# Patient Record
Sex: MALE | Race: OTHER | Employment: OTHER | URBAN - METROPOLITAN AREA
[De-identification: names, ages, dates, MRNs, and addresses within clinical notes are randomized per-mention and may not be internally consistent; named-entity substitution may affect disease eponyms.]

---

## 2019-01-01 ENCOUNTER — TELEPHONE (OUTPATIENT)
Dept: ORTHOPEDIC SURGERY | Age: 64
End: 2019-01-01

## 2019-12-17 PROBLEM — J44.9 CHRONIC OBSTRUCTIVE PULMONARY DISEASE (HCC): Status: ACTIVE | Noted: 2018-06-10

## 2019-12-17 PROBLEM — M15.9 PRIMARY OSTEOARTHRITIS INVOLVING MULTIPLE JOINTS: Status: ACTIVE | Noted: 2018-06-10

## 2019-12-17 PROBLEM — C61 MALIGNANT NEOPLASM OF PROSTATE (HCC): Status: ACTIVE | Noted: 2018-06-10

## 2019-12-20 NOTE — TELEPHONE ENCOUNTER
Patient was connected with [facility] in error after hours via answering service. He will contact his neurologists office.     Tan Ugalde PA-C  12/19/2019  8:50 PM

## 2020-01-01 ENCOUNTER — TELEPHONE (OUTPATIENT)
Dept: FAMILY MEDICINE CLINIC | Age: 65
End: 2020-01-01

## 2020-01-01 ENCOUNTER — OFFICE VISIT (OUTPATIENT)
Dept: FAMILY MEDICINE CLINIC | Age: 65
End: 2020-01-01

## 2020-01-01 ENCOUNTER — OFFICE VISIT (OUTPATIENT)
Dept: ONCOLOGY | Age: 65
End: 2020-01-01

## 2020-01-01 ENCOUNTER — HOSPITAL ENCOUNTER (OUTPATIENT)
Dept: GENERAL RADIOLOGY | Age: 65
Discharge: HOME | End: 2020-02-28
Payer: MEDICARE

## 2020-01-01 VITALS
BODY MASS INDEX: 20.09 KG/M2 | SYSTOLIC BLOOD PRESSURE: 100 MMHG | OXYGEN SATURATION: 99 % | TEMPERATURE: 98.1 F | RESPIRATION RATE: 16 BRPM | HEART RATE: 55 BPM | WEIGHT: 125 LBS | DIASTOLIC BLOOD PRESSURE: 58 MMHG | HEIGHT: 66 IN

## 2020-01-01 VITALS
SYSTOLIC BLOOD PRESSURE: 86 MMHG | RESPIRATION RATE: 16 BRPM | HEART RATE: 80 BPM | HEIGHT: 70 IN | OXYGEN SATURATION: 98 % | DIASTOLIC BLOOD PRESSURE: 56 MMHG | BODY MASS INDEX: 17.72 KG/M2 | WEIGHT: 123.8 LBS | TEMPERATURE: 98.8 F

## 2020-01-01 VITALS
RESPIRATION RATE: 16 BRPM | TEMPERATURE: 98.1 F | BODY MASS INDEX: 21.82 KG/M2 | OXYGEN SATURATION: 98 % | DIASTOLIC BLOOD PRESSURE: 68 MMHG | HEART RATE: 83 BPM | WEIGHT: 144 LBS | SYSTOLIC BLOOD PRESSURE: 124 MMHG | HEIGHT: 68 IN

## 2020-01-01 VITALS
SYSTOLIC BLOOD PRESSURE: 108 MMHG | TEMPERATURE: 98.9 F | RESPIRATION RATE: 16 BRPM | WEIGHT: 144 LBS | DIASTOLIC BLOOD PRESSURE: 68 MMHG | OXYGEN SATURATION: 99 % | BODY MASS INDEX: 21.82 KG/M2 | HEIGHT: 68 IN | HEART RATE: 68 BPM

## 2020-01-01 VITALS
TEMPERATURE: 98.6 F | HEART RATE: 68 BPM | SYSTOLIC BLOOD PRESSURE: 102 MMHG | RESPIRATION RATE: 16 BRPM | BODY MASS INDEX: 22.19 KG/M2 | HEIGHT: 70 IN | DIASTOLIC BLOOD PRESSURE: 64 MMHG | WEIGHT: 155 LBS | OXYGEN SATURATION: 98 %

## 2020-01-01 DIAGNOSIS — J20.9 ACUTE BRONCHITIS, UNSPECIFIED ORGANISM: Primary | ICD-10-CM

## 2020-01-01 DIAGNOSIS — C61 MALIGNANT NEOPLASM OF PROSTATE (HCC): Primary | ICD-10-CM

## 2020-01-01 DIAGNOSIS — J20.9 ACUTE BRONCHITIS, UNSPECIFIED ORGANISM: ICD-10-CM

## 2020-01-01 DIAGNOSIS — Z72.0 TOBACCO USE: ICD-10-CM

## 2020-01-01 DIAGNOSIS — C61 MALIGNANT NEOPLASM OF PROSTATE (HCC): ICD-10-CM

## 2020-01-01 DIAGNOSIS — M54.16 LUMBAR RADICULOPATHY: ICD-10-CM

## 2020-01-01 DIAGNOSIS — Z11.59 ENCOUNTER FOR HEPATITIS C SCREENING TEST FOR LOW RISK PATIENT: ICD-10-CM

## 2020-01-01 DIAGNOSIS — Z13.220 SCREENING, LIPID: ICD-10-CM

## 2020-01-01 DIAGNOSIS — F17.200 SMOKER: ICD-10-CM

## 2020-01-01 DIAGNOSIS — Z12.11 COLON CANCER SCREENING: ICD-10-CM

## 2020-01-01 DIAGNOSIS — C61 METASTASIS FROM MALIGNANT NEOPLASM OF PROSTATE (HCC): ICD-10-CM

## 2020-01-01 DIAGNOSIS — Z00.00 MEDICARE ANNUAL WELLNESS VISIT, SUBSEQUENT: Primary | ICD-10-CM

## 2020-01-01 DIAGNOSIS — C79.9 METASTASIS FROM MALIGNANT NEOPLASM OF PROSTATE (HCC): ICD-10-CM

## 2020-01-01 DIAGNOSIS — Z13.1 SCREENING FOR DIABETES MELLITUS (DM): ICD-10-CM

## 2020-01-01 DIAGNOSIS — R05.9 COUGH: ICD-10-CM

## 2020-01-01 DIAGNOSIS — Z71.89 ADVANCED DIRECTIVES, COUNSELING/DISCUSSION: ICD-10-CM

## 2020-01-01 LAB
FLUAV+FLUBV AG NOSE QL IA.RAPID: NEGATIVE POS/NEG
FLUAV+FLUBV AG NOSE QL IA.RAPID: NEGATIVE POS/NEG
VALID INTERNAL CONTROL?: YES

## 2020-01-01 PROCEDURE — 71046 X-RAY EXAM CHEST 2 VIEWS: CPT

## 2020-01-01 RX ORDER — MORPHINE SULFATE 20 MG/ML
SOLUTION ORAL
COMMUNITY
Start: 2020-01-01

## 2020-01-01 RX ORDER — GABAPENTIN 600 MG/1
TABLET ORAL
COMMUNITY
Start: 2019-01-01 | End: 2020-01-01

## 2020-01-01 RX ORDER — ALBUTEROL SULFATE 90 UG/1
2 AEROSOL, METERED RESPIRATORY (INHALATION)
Qty: 1 INHALER | Refills: 0 | Status: SHIPPED | OUTPATIENT
Start: 2020-01-01 | End: 2020-01-01 | Stop reason: SDUPTHER

## 2020-01-01 RX ORDER — MORPHINE SULFATE 30 MG/1
TABLET, FILM COATED, EXTENDED RELEASE ORAL
COMMUNITY
Start: 2020-01-01

## 2020-01-01 RX ORDER — AZITHROMYCIN 250 MG/1
TABLET, FILM COATED ORAL
Qty: 6 TAB | Refills: 0 | Status: SHIPPED | OUTPATIENT
Start: 2020-01-01 | End: 2020-01-01 | Stop reason: SDUPTHER

## 2020-01-01 RX ORDER — CEPHALEXIN 500 MG/1
CAPSULE ORAL
COMMUNITY
Start: 2019-01-01 | End: 2020-01-01

## 2020-01-01 RX ORDER — HYDROMORPHONE HYDROCHLORIDE 2 MG/1
TABLET ORAL
COMMUNITY
Start: 2020-01-01 | End: 2020-01-01

## 2020-01-01 RX ORDER — AZITHROMYCIN 250 MG/1
TABLET, FILM COATED ORAL
COMMUNITY
Start: 2019-01-01 | End: 2020-01-01 | Stop reason: SDUPTHER

## 2020-01-01 RX ORDER — AZITHROMYCIN 250 MG/1
TABLET, FILM COATED ORAL
Qty: 6 TAB | Refills: 0 | Status: SHIPPED | OUTPATIENT
Start: 2020-01-01 | End: 2020-01-01

## 2020-01-01 RX ORDER — ALBUTEROL SULFATE 90 UG/1
AEROSOL, METERED RESPIRATORY (INHALATION)
Qty: 6.7 INHALER | Refills: 0 | Status: SHIPPED | OUTPATIENT
Start: 2020-01-01 | End: 2020-01-01

## 2020-01-01 RX ORDER — ALBUTEROL SULFATE 90 UG/1
2 AEROSOL, METERED RESPIRATORY (INHALATION)
Qty: 1 INHALER | Refills: 0 | Status: SHIPPED | OUTPATIENT
Start: 2020-01-01 | End: 2020-01-01

## 2020-01-01 RX ORDER — HYDROMORPHONE HYDROCHLORIDE 4 MG/1
TABLET ORAL
COMMUNITY
Start: 2020-01-01

## 2020-01-01 RX ORDER — DEXAMETHASONE 4 MG/1
TABLET ORAL
COMMUNITY
Start: 2020-01-01

## 2020-01-09 NOTE — PROGRESS NOTES
Marce Villalobos, 59 y.o.,  male    SUBJECTIVE  Establish care    Prostate cancer metastatic to bone- dx 2016, pt with urinary retention on pérez. He is following urology dr Karen Veliz and oncology Ludie Claude  He has chronic back pain on gabapentin, he is following with neurologist, unclear if due to bone mets    Smoker- on records he has COPD, he denies recurrent bronchitis or inhaler use. He continues to smoke    ROS:  See HPI, all others negative        Patient Active Problem List   Diagnosis Code    Chronic obstructive pulmonary disease ([facility] [address] J44.9    Malignant neoplasm of prostate ([facility] [address] C61    Primary osteoarthritis involving multiple joints M15.0       Current Outpatient Medications   Medication Sig Dispense Refill    gabapentin (NEURONTIN) 600 mg tablet       enzalutamide (XTANDI) 40 mg capsule Take 160 mg by mouth daily.  cyclobenzaprine (FLEXERIL) 5 mg tablet Take 5 mg by mouth.  oxyCODONE-acetaminophen (PERCOCET) 5-325 mg per tablet Take  by mouth every four (4) hours as needed for Pain.  predniSONE (DELTASONE) 5 mg tablet Take  by mouth.  azithromycin (ZITHROMAX) 250 mg tablet       cephALEXin (KEFLEX) 500 mg capsule       diclofenac (VOLTAREN) 1 % gel Apply  to affected area four (4) times daily.  diazePAM (VALIUM) 5 mg tablet Take 5 mg by mouth every six (6) hours as needed for Anxiety.  abiraterone (ZYTIGA) 250 mg tab Take  by mouth.          Allergies   Allergen Reactions    Naproxen Hives       Past Medical History:   Diagnosis Date    COPD (chronic obstructive pulmonary disease) ([facility] [address]     Prostate cancer (HCC)     Prostate cancer metastatic to multiple sites [facility])        Social History     Socioeconomic History    Marital status: SINGLE     Spouse name: Not on file    Number of children: Not on file    Years of education: Not on file    Highest education level: Not on file   Occupational History    Not on file   Social Needs    Financial resource strain: Not on file    Food insecurity:     Worry: Not on file     Inability: Not on file    Transportation needs:     Medical: Not on file     Non-medical: Not on file   Tobacco Use    Smoking status: Current Every Day Smoker    Smokeless tobacco: Never Used   Substance and Sexual Activity    Alcohol use: Not Currently    Drug use: Not on file    Sexual activity: Not on file   Lifestyle    Physical activity:     Days per week: Not on file     Minutes per session: Not on file    Stress: Not on file   Relationships    Social connections:     Talks on phone: Not on file     Gets together: Not on file     Attends Druze service: Not on file     Active member of club or organization: Not on file     Attends meetings of clubs or organizations: Not on file     Relationship status: Not on file    Intimate partner violence:     Fear of current or ex partner: Not on file     Emotionally abused: Not on file     Physically abused: Not on file     Forced sexual activity: Not on file   Other Topics Concern    Not on file   Social History Narrative    Not on file       Family History   Problem Relation Age of Onset    Cancer Mother     Cancer Father          OBJECTIVE    Physical Exam:     Visit Vitals  /64 (BP 1 Location: Left arm, BP Patient Position: Sitting)   Pulse 68   Temp 98.6 °F (37 °C) (Oral)   Resp 16   Ht 5' 10\" (1.778 m)   Wt 155 lb (70.3 kg)   SpO2 98%   BMI 22.24 kg/m²       General: alert, chronically ill-appearing, in no apparent distress or pain  Head: atraumatic.  Non-tender maxillary and frontal sinuses  Eyes: Lids with no discharge, no matting, conjunctivae clear and non injected, full EOMs, PERLLA  Ears: pinna non-tender, external auditory canal patent, TM intact  Mouth/throat:tonsils non enlarged, pharynx non erythematous and no lesion, nasal mucosa normal, poor dentition  Neck: supple, no adenopathy palpated  CVS: normal rate, regular rhythm, distinct S1 and S2  Lungs:clear to ausculation bilaterally, no crackles, wheezing or rhonchi noted  Abdomen: normoactive bowel sounds, soft, non-tender  Extremities: no edema, no cyanosis, MSK grossly normal  Skin: warm, no lesions, rashes noted  Psych:  mood and affect normal        ASSESSMENT/PLAN  Diagnoses and all orders for this visit:    1. Malignant neoplasm of prostate ([facility] [address]  With bone metastasis and urinary retention, on pérez  Following urology/oncology    2. Lumbar radiculopathy  Fair control  Following neurology    3. Tobacco use  Discussed CV screening and PCV/flu vaccines, he declines  Encouraged cessation      Follow-up and Dispositions    · Return if symptoms worsen or fail to improve. Patient understands plan of care. Patient has provided input and agrees with goals.

## 2020-01-09 NOTE — PATIENT INSTRUCTIONS
Stopping Smoking: Care Instructions  Your Care Instructions  Cigarette smokers crave the nicotine in cigarettes. Giving it up is much harder than simply changing a habit. Your body has to stop craving the nicotine. It is hard to quit, but you can do it. There are many tools that people use to quit smoking. You may find that combining tools works best for you. There are several steps to quitting. First you get ready to quit. Then you get support to help you. After that, you learn new skills and behaviors to become a nonsmoker. For many people, a necessary step is getting and using medicine. Your doctor will help you set up the plan that best meets your needs. You may want to attend a smoking cessation program to help you quit smoking. When you choose a program, look for one that has proven success. Ask your doctor for ideas. You will greatly increase your chances of success if you take medicine as well as get counseling or join a cessation program.  Some of the changes you feel when you first quit tobacco are uncomfortable. Your body will miss the nicotine at first, and you may feel short-tempered and grumpy. You may have trouble sleeping or concentrating. Medicine can help you deal with these symptoms. You may struggle with changing your smoking habits and rituals. The last step is the tricky one: Be prepared for the smoking urge to continue for a time. This is a lot to deal with, but keep at it. You will feel better. Follow-up care is a key part of your treatment and safety. Be sure to make and go to all appointments, and call your doctor if you are having problems. It's also a good idea to know your test results and keep a list of the medicines you take. How can you care for yourself at home? · Ask your family, friends, and coworkers for support. You have a better chance of quitting if you have help and support.   · Join a support group, such as Nicotine Anonymous, for people who are trying to quit smoking. · Consider signing up for a smoking cessation program, such as the American Lung Association's Freedom from Smoking program.  · Get text messaging support. Go to the website at www.smokefree. gov to sign up for the [facility] program.  · Set a quit date. Pick your date carefully so that it is not right in the middle of a big deadline or stressful time. Once you quit, do not even take a puff. Get rid of all ashtrays and lighters after your last cigarette. Clean your house and your clothes so that they do not smell of smoke. · Learn how to be a nonsmoker. Think about ways you can avoid those things that make you reach for a cigarette. ? Avoid situations that put you at greatest risk for smoking. For some people, it is hard to have a drink with friends without smoking. For others, they might skip a coffee break with coworkers who smoke. ? Change your daily routine. Take a different route to work or eat a meal in a different place. · Cut down on stress. Calm yourself or release tension by doing an activity you enjoy, such as reading a book, taking a hot bath, or gardening. · Talk to your doctor or pharmacist about nicotine replacement therapy, which replaces the nicotine in your body. You still get nicotine but you do not use tobacco. Nicotine replacement products help you slowly reduce the amount of nicotine you need. These products come in several forms, many of them available over-the-counter:  ? Nicotine patches  ? Nicotine gum and lozenges  ? Nicotine inhaler  · Ask your doctor about bupropion (Wellbutrin) or varenicline (Chantix), which are prescription medicines. They do not contain nicotine. They help you by reducing withdrawal symptoms, such as stress and anxiety. · Some people find hypnosis, acupuncture, and massage helpful for ending the smoking habit. · Eat a healthy diet and get regular exercise. Having healthy habits will help your body move past its craving for nicotine.   · Be prepared to keep trying. Most people are not successful the first few times they try to quit. Do not get mad at yourself if you smoke again. Make a list of things you learned and think about when you want to try again, such as next week, next month, or next year. Where can you learn more? Go to http://vangie-sanjay.info/. Enter N884 in the search box to learn more about \"Stopping Smoking: Care Instructions. \"  Current as of: September 26, 2018  Content Version: 12.2  © 0745-1825 [employer]. Care instructions adapted under license by [employer] (which disclaims liability or warranty for this information). If you have questions about a medical condition or this instruction, always ask your healthcare professional. [address] any warranty or liability for your use of this information.

## 2020-02-28 NOTE — PROGRESS NOTES
Tho Masters, 59 y.o.,  male    SUBJECTIVE  Cough x 1 week    C/o productive cough, sob, wheezing, chills past week. He is a smoker. Denies previous h/o COPD, and last albuterol use was about 3 years ago. He has prostate ca, ongoing radiation. Did not get flu vaccine this season. ROS:  See HPI, all others negative        Patient Active Problem List   Diagnosis Code    Chronic obstructive pulmonary disease ([facility] [address] J44.9    Malignant neoplasm of prostate ([facility] [address] C61    Primary osteoarthritis involving multiple joints M15.0       Current Outpatient Medications   Medication Sig Dispense Refill    azithromycin (ZITHROMAX) 250 mg tablet Use as directed 6 Tab 0    albuterol (PROVENTIL HFA, VENTOLIN HFA, PROAIR HFA) 90 mcg/actuation inhaler Take 2 Puffs by inhalation every four (4) hours as needed for Wheezing. 1 Inhaler 0    gabapentin (NEURONTIN) 600 mg tablet       cyclobenzaprine (FLEXERIL) 5 mg tablet Take 5 mg by mouth.  oxyCODONE-acetaminophen (PERCOCET) 5-325 mg per tablet Take  by mouth every four (4) hours as needed for Pain.  cephALEXin (KEFLEX) 500 mg capsule       enzalutamide (XTANDI) 40 mg capsule Take 160 mg by mouth daily.  diclofenac (VOLTAREN) 1 % gel Apply  to affected area four (4) times daily.  predniSONE (DELTASONE) 5 mg tablet Take  by mouth.  diazePAM (VALIUM) 5 mg tablet Take 5 mg by mouth every six (6) hours as needed for Anxiety.  abiraterone (ZYTIGA) 250 mg tab Take  by mouth.          Allergies   Allergen Reactions    Naproxen Hives       Past Medical History:   Diagnosis Date    COPD (chronic obstructive pulmonary disease) (HCC)     Prostate cancer (HCC)     Prostate cancer metastatic to multiple sites [facility])        Social History     Socioeconomic History    Marital status:      Spouse name: Not on file    Number of children: Not on file    Years of education: Not on file    Highest education level: Not on file   Occupational History  Not on file   Social Needs    Financial resource strain: Not on file    Food insecurity:     Worry: Not on file     Inability: Not on file    Transportation needs:     Medical: Not on file     Non-medical: Not on file   Tobacco Use    Smoking status: Current Every Day Smoker    Smokeless tobacco: Never Used   Substance and Sexual Activity    Alcohol use: Not Currently    Drug use: Not on file    Sexual activity: Not on file   Lifestyle    Physical activity:     Days per week: Not on file     Minutes per session: Not on file    Stress: Not on file   Relationships    Social connections:     Talks on phone: Not on file     Gets together: Not on file     Attends Mandaen service: Not on file     Active member of club or organization: Not on file     Attends meetings of clubs or organizations: Not on file     Relationship status: Not on file    Intimate partner violence:     Fear of current or ex partner: Not on file     Emotionally abused: Not on file     Physically abused: Not on file     Forced sexual activity: Not on file   Other Topics Concern    Not on file   Social History Narrative    Not on file       Family History   Problem Relation Age of Onset    Cancer Mother     Cancer Father          OBJECTIVE    Physical Exam:     Visit Vitals  /68 (BP 1 Location: Left arm, BP Patient Position: Sitting)   Pulse 83   Temp 98.1 °F (36.7 °C) (Oral)   Resp 16   Ht 5' 8\" (1.727 m)   Wt 144 lb (65.3 kg)   SpO2 98%   BMI 21.90 kg/m²       General: alert, mildly ill-appearing,   Head: atraumatic.  Non-tender maxillary and frontal sinuses  Eyes: Lids with no discharge, no matting, conjunctivae clear and non injected, full EOMs, PERLLA  Ears: pinna non-tender, external auditory canal patent, TM intact  Mouth/throat:tonsils non enlarged, pharynx non erythematous and no lesion, nasal mucosa normal  Neck: supple, no adenopathy palpated  CVS: normal rate, regular rhythm, distinct S1 and S2  Lungs:+ diffuse wheeze  Abdomen: normoactive bowel sounds, soft, non-tender  Extremities: no edema, no cyanosis, MSK grossly normal  Skin: warm, no lesions, rashes noted  Psych:  mood and affect normal    Results for orders placed or performed in visit on 02/28/20   AMB POC RAPID INFLUENZA TEST   Result Value Ref Range    VALID INTERNAL CONTROL POC Yes     Influenza A Ag POC Negative Negative Pos/Neg    Influenza B Ag POC Negative Negative Pos/Neg         ASSESSMENT/PLAN  Diagnoses and all orders for this visit:    1. Acute bronchitis, unspecified organism  -     XR CHEST PA LAT; Future\  Start:  -     azithromycin (ZITHROMAX) 250 mg tablet; Use as directed  -     albuterol (PROVENTIL HFA, VENTOLIN HFA, PROAIR HFA) 90 mcg/actuation inhaler; Take 2 Puffs by inhalation every four (4) hours as needed for Wheezing. 2. Cough  -     AMB POC RAPID INFLUENZA TEST    3. Smoker  -     XR CHEST PA LAT; Future  Encouraged smoking cessation    Other orders    Follow-up and Dispositions    · Return if symptoms worsen or fail to improve. Patient understands plan of care. Patient has provided input and agrees with goals.

## 2020-02-28 NOTE — PATIENT INSTRUCTIONS
Stopping Smoking: Care Instructions  Your Care Instructions  Cigarette smokers crave the nicotine in cigarettes. Giving it up is much harder than simply changing a habit. Your body has to stop craving the nicotine. It is hard to quit, but you can do it. There are many tools that people use to quit smoking. You may find that combining tools works best for you. There are several steps to quitting. First you get ready to quit. Then you get support to help you. After that, you learn new skills and behaviors to become a nonsmoker. For many people, a necessary step is getting and using medicine. Your doctor will help you set up the plan that best meets your needs. You may want to attend a smoking cessation program to help you quit smoking. When you choose a program, look for one that has proven success. Ask your doctor for ideas. You will greatly increase your chances of success if you take medicine as well as get counseling or join a cessation program.  Some of the changes you feel when you first quit tobacco are uncomfortable. Your body will miss the nicotine at first, and you may feel short-tempered and grumpy. You may have trouble sleeping or concentrating. Medicine can help you deal with these symptoms. You may struggle with changing your smoking habits and rituals. The last step is the tricky one: Be prepared for the smoking urge to continue for a time. This is a lot to deal with, but keep at it. You will feel better. Follow-up care is a key part of your treatment and safety. Be sure to make and go to all appointments, and call your doctor if you are having problems. It's also a good idea to know your test results and keep a list of the medicines you take. How can you care for yourself at home? · Ask your family, friends, and coworkers for support. You have a better chance of quitting if you have help and support.   · Join a support group, such as Nicotine Anonymous, for people who are trying to quit smoking. · Consider signing up for a smoking cessation program, such as the American Lung Association's Freedom from Smoking program.  · Get text messaging support. Go to the website at www.smokefree. gov to sign up for the [facility] program.  · Set a quit date. Pick your date carefully so that it is not right in the middle of a big deadline or stressful time. Once you quit, do not even take a puff. Get rid of all ashtrays and lighters after your last cigarette. Clean your house and your clothes so that they do not smell of smoke. · Learn how to be a nonsmoker. Think about ways you can avoid those things that make you reach for a cigarette. ? Avoid situations that put you at greatest risk for smoking. For some people, it is hard to have a drink with friends without smoking. For others, they might skip a coffee break with coworkers who smoke. ? Change your daily routine. Take a different route to work or eat a meal in a different place. · Cut down on stress. Calm yourself or release tension by doing an activity you enjoy, such as reading a book, taking a hot bath, or gardening. · Talk to your doctor or pharmacist about nicotine replacement therapy, which replaces the nicotine in your body. You still get nicotine but you do not use tobacco. Nicotine replacement products help you slowly reduce the amount of nicotine you need. These products come in several forms, many of them available over-the-counter:  ? Nicotine patches  ? Nicotine gum and lozenges  ? Nicotine inhaler  · Ask your doctor about bupropion (Wellbutrin) or varenicline (Chantix), which are prescription medicines. They do not contain nicotine. They help you by reducing withdrawal symptoms, such as stress and anxiety. · Some people find hypnosis, acupuncture, and massage helpful for ending the smoking habit. · Eat a healthy diet and get regular exercise. Having healthy habits will help your body move past its craving for nicotine.   · Be prepared to keep trying. Most people are not successful the first few times they try to quit. Do not get mad at yourself if you smoke again. Make a list of things you learned and think about when you want to try again, such as next week, next month, or next year. Where can you learn more? Go to http://vangie-sanjay.info/. Enter U270 in the search box to learn more about \"Stopping Smoking: Care Instructions. \"  Current as of: September 26, 2018  Content Version: 12.2  © 0815-1607 [employer]. Care instructions adapted under license by [employer] (which disclaims liability or warranty for this information). If you have questions about a medical condition or this instruction, always ask your healthcare professional. [address] any warranty or liability for your use of this information. Bronchitis: Care Instructions  Your Care Instructions    Bronchitis is inflammation of the bronchial tubes, which carry air to the lungs. The tubes swell and produce mucus, or phlegm. The mucus and inflamed bronchial tubes make you cough. You may have trouble breathing. Most cases of bronchitis are caused by viruses like those that cause colds. Antibiotics usually do not help and they may be harmful. Bronchitis usually develops rapidly and lasts about 2 to 3 weeks in otherwise healthy people. Follow-up care is a key part of your treatment and safety. Be sure to make and go to all appointments, and call your doctor if you are having problems. It's also a good idea to know your test results and keep a list of the medicines you take. How can you care for yourself at home? · Take all medicines exactly as prescribed. Call your doctor if you think you are having a problem with your medicine.   · Get some extra rest.  · Take an over-the-counter pain medicine, such as acetaminophen (Tylenol), ibuprofen (Advil, Motrin), or naproxen (Aleve) to reduce fever and relieve body aches. Read and follow all instructions on the label. · Do not take two or more pain medicines at the same time unless the doctor told you to. Many pain medicines have acetaminophen, which is Tylenol. Too much acetaminophen (Tylenol) can be harmful. · Take an over-the-counter cough medicine that contains dextromethorphan to help quiet a dry, hacking cough so that you can sleep. Avoid cough medicines that have more than one active ingredient. Read and follow all instructions on the label. · Breathe moist air from a humidifier, hot shower, or sink filled with hot water. The heat and moisture will thin mucus so you can cough it out. · Do not smoke. Smoking can make bronchitis worse. If you need help quitting, talk to your doctor about stop-smoking programs and medicines. These can increase your chances of quitting for good. When should you call for help? Call 911 anytime you think you may need emergency care. For example, call if:    · You have severe trouble breathing.    Call your doctor now or seek immediate medical care if:    · You have new or worse trouble breathing.     · You cough up dark brown or bloody mucus (sputum).     · You have a new or higher fever.     · You have a new rash.    Watch closely for changes in your health, and be sure to contact your doctor if:    · You cough more deeply or more often, especially if you notice more mucus or a change in the color of your mucus.     · You are not getting better as expected. Where can you learn more? Go to http://vangie-sanjay.info/. Enter H333 in the search box to learn more about \"Bronchitis: Care Instructions. \"  Current as of: June 9, 2019  Content Version: 12.2  © 4056-6635 [employer]. Care instructions adapted under license by [employer] (which disclaims liability or warranty for this information).  If you have questions about a medical condition or this instruction, always ask your healthcare professional. [address] any warranty or liability for your use of this information.

## 2020-02-28 NOTE — PROGRESS NOTES
1. Have you been to the ER, urgent care clinic since your last visit? Hospitalized since your last visit? No    2. Have you seen or consulted any other health care providers outside of the [address] [place] [address] since your last visit? Include any pap smears or colon screening.  No

## 2020-03-20 PROBLEM — M54.16 LUMBAR RADICULOPATHY: Status: ACTIVE | Noted: 2020-01-01

## 2020-03-20 PROBLEM — Z72.0 TOBACCO USE: Status: ACTIVE | Noted: 2020-01-01

## 2020-03-20 NOTE — PROGRESS NOTES
This is the Subsequent Medicare Annual Wellness Exam, performed 12 months or more after the Initial AWV or the last Subsequent AWV    I have reviewed the patient's medical history in detail and updated the computerized patient record. History     Patient Active Problem List   Diagnosis Code    Chronic obstructive pulmonary disease ([facility] [address] J44.9    Malignant neoplasm of prostate ([facility] [address] C61    Primary osteoarthritis involving multiple joints M15.0     Past Medical History:   Diagnosis Date    COPD (chronic obstructive pulmonary disease) ([facility] [address]     Prostate cancer ([facility] [address]     Prostate cancer metastatic to multiple sites [facility])       Past Surgical History:   Procedure Laterality Date    HX HERNIA REPAIR      HX UROLOGICAL      Prostate Biopsy     Current Outpatient Medications   Medication Sig Dispense Refill    albuterol (PROVENTIL HFA, VENTOLIN HFA, PROAIR HFA) 90 mcg/actuation inhaler Take 2 Puffs by inhalation every four (4) hours as needed for Wheezing. 1 Inhaler 0    cyclobenzaprine (FLEXERIL) 5 mg tablet Take 5 mg by mouth.  oxyCODONE-acetaminophen (PERCOCET) 5-325 mg per tablet Take  by mouth every four (4) hours as needed for Pain.  azithromycin (ZITHROMAX) 250 mg tablet Use as directed 6 Tab 0    cephALEXin (KEFLEX) 500 mg capsule       gabapentin (NEURONTIN) 600 mg tablet       enzalutamide (XTANDI) 40 mg capsule Take 160 mg by mouth daily.  diclofenac (VOLTAREN) 1 % gel Apply  to affected area four (4) times daily.  predniSONE (DELTASONE) 5 mg tablet Take  by mouth.  diazePAM (VALIUM) 5 mg tablet Take 5 mg by mouth every six (6) hours as needed for Anxiety.  abiraterone (ZYTIGA) 250 mg tab Take  by mouth.        Allergies   Allergen Reactions    Naproxen Hives       Family History   Problem Relation Age of Onset    Cancer Mother     Cancer Father      Social History     Tobacco Use    Smoking status: Current Every Day Smoker    Smokeless tobacco: Never Used Substance Use Topics    Alcohol use: Not Currently       Depression Risk Factor Screening:     3 most recent PHQ Screens 3/20/2020   Little interest or pleasure in doing things Not at all   Feeling down, depressed, irritable, or hopeless Not at all   Total Score PHQ 2 0       Alcohol Risk Factor Screening (MALE < 65): Do you average more than 2 drinks per night or 14 drinks a week: No    On any one occasion in the past three months have you have had more than 4 drinks containing alcohol:  No      Functional Ability and Level of Safety:   Hearing: Hearing is good. Activities of Daily Living: The home contains: no safety equipment. Patient does total self care    Ambulation: with no difficulty    Fall Risk:  No flowsheet data found.     Abuse Screen:  Patient is not abused    Cognitive Screening   Has your family/caregiver stated any concerns about your memory: no      Patient Care Team   Patient Care Team:  Santos Tamayo MD as PCP - General (Family Practice)  Santos Tamayo MD as PCP - [facility] Empaneled Provider  Willard Gonzalez MD (Hematology and Oncology)    Assessment/Plan   Education and counseling provided:  Are appropriate based on today's review and evaluation  End-of-Life planning (with patient's consent)- discussed, provided form  Pneumococcal Vaccine- pt declines  Influenza Vaccine- pt declines  Colorectal cancer screening tests- pt wants to cont to check, FIT test provided  Cardiovascular screening blood test- pt wants to check, order placed  Diabetes screening test- pt wants to check, order placed    Medicare annual wellness visit, subsequent      Health Maintenance Due   Topic Date Due    Hepatitis C Screening  1955    Pneumococcal 0-64 years (1 of 3 - PCV13) 06/19/1961    DTaP/Tdap/Td series (1 - Tdap) 06/19/1976    Shingrix Vaccine Age 50> (1 of 2) 06/19/2005    FOBT Q1Y Age 50-75  06/19/2005    Medicare Yearly Exam  02/28/2020     Cyrus Bumpers, 59 y.o., male    SUBJECTIVE  Ff-up    Acute bronchitis- cough has improved, continues to be afebrile. He completed zpack, cxr reviewed no acute findings. He denies recurrent bronchitis episodes and denies previous h/o copd. He denies sob. He continues to smoker. Prostate cancer metastatic to bone- dx 2016, pt with urinary retention on pérez. He is following urology, reviewed note on ADT with lupron [id number], on palliative radiation, also following oncology   He has chronic back pain on gabapentin, he is following with neurologist, unclear if due to bone mets    ROS:  See HPI, all others negative        Patient Active Problem List   Diagnosis Code    Chronic obstructive pulmonary disease ([facility] [address] J44.9    Malignant neoplasm of prostate ([facility] [address] C61    Primary osteoarthritis involving multiple joints M15.0       Current Outpatient Medications   Medication Sig Dispense Refill    albuterol (PROVENTIL HFA, VENTOLIN HFA, PROAIR HFA) 90 mcg/actuation inhaler Take 2 Puffs by inhalation every four (4) hours as needed for Wheezing. 1 Inhaler 0    cyclobenzaprine (FLEXERIL) 5 mg tablet Take 5 mg by mouth.  oxyCODONE-acetaminophen (PERCOCET) 5-325 mg per tablet Take  by mouth every four (4) hours as needed for Pain.  azithromycin (ZITHROMAX) 250 mg tablet Use as directed 6 Tab 0    cephALEXin (KEFLEX) 500 mg capsule       gabapentin (NEURONTIN) 600 mg tablet       enzalutamide (XTANDI) 40 mg capsule Take 160 mg by mouth daily.  diclofenac (VOLTAREN) 1 % gel Apply  to affected area four (4) times daily.  predniSONE (DELTASONE) 5 mg tablet Take  by mouth.  diazePAM (VALIUM) 5 mg tablet Take 5 mg by mouth every six (6) hours as needed for Anxiety.  abiraterone (ZYTIGA) 250 mg tab Take  by mouth.          Allergies   Allergen Reactions    Naproxen Hives       Past Medical History:   Diagnosis Date    COPD (chronic obstructive pulmonary disease) (HCC)     Prostate cancer (HCC)     Prostate cancer metastatic to multiple sites [facility])        Social History     Socioeconomic History    Marital status: SINGLE     Spouse name: Not on file    Number of children: Not on file    Years of education: Not on file    Highest education level: Not on file   Occupational History    Not on file   Social Needs    Financial resource strain: Not on file    Food insecurity     Worry: Not on file     Inability: Not on file    Transportation needs     Medical: Not on file     Non-medical: Not on file   Tobacco Use    Smoking status: Current Every Day Smoker    Smokeless tobacco: Never Used   Substance and Sexual Activity    Alcohol use: Not Currently    Drug use: Not on file    Sexual activity: Not on file   Lifestyle    Physical activity     Days per week: Not on file     Minutes per session: Not on file    Stress: Not on file   Relationships    Social connections     Talks on phone: Not on file     Gets together: Not on file     Attends Anglican service: Not on file     Active member of club or organization: Not on file     Attends meetings of clubs or organizations: Not on file     Relationship status: Not on file    Intimate partner violence     Fear of current or ex partner: Not on file     Emotionally abused: Not on file     Physically abused: Not on file     Forced sexual activity: Not on file   Other Topics Concern    Not on file   Social History Narrative    Not on file       Family History   Problem Relation Age of Onset    Cancer Mother     Cancer Father          OBJECTIVE    Physical Exam:     Visit Vitals  /68 (BP 1 Location: Left arm, BP Patient Position: Sitting)   Pulse 68   Temp 98.9 °F (37.2 °C) (Oral)   Resp 16   Ht 5' 8\" (1.727 m)   Wt 144 lb (65.3 kg)   SpO2 99%   BMI 21.90 kg/m²       General: alert, chronically ill-appearing, in no apparent distress or pain  Head: atraumatic.  Non-tender maxillary and frontal sinuses  Eyes: Lids with no discharge, no matting, conjunctivae clear and non injected, full EOMs, PERLLA  Ears: pinna non-tender, external auditory canal patent, TM intact  Mouth/throat:tonsils non enlarged, pharynx non erythematous and no lesion, nasal mucosa normal, poor dentition  Neck: supple, no adenopathy palpated  CVS: normal rate, regular rhythm, distinct S1 and S2  Lungs:clear to ausculation bilaterally, no crackles, wheezing or rhonchi noted  Abdomen: normoactive bowel sounds, soft, non-tender  Extremities: no edema, no cyanosis, MSK grossly normal  Skin: warm, no lesions, rashes noted  Psych:  mood and affect normal        ASSESSMENT/PLAN  Diagnoses and all orders for this visit:    1. Malignant neoplasm of prostate ([facility] [address]  With bone metastasis and urinary retention, on pérez  Following urology/oncology    2. Lumbar radiculopathy  Fair control  Following neurology    3. Tobacco use  Discussed CV screening and PCV/flu vaccines, he declines  Encouraged cessation    4. Acute bronchitis, unspecified organism  Clinically improving  cxr neg  Smoking cessation encouraged    Follow-up and Dispositions    · Return in about 3 months (around 6/20/2020), or if symptoms worsen or fail to improve, for fasting labs a week prior to your next visit, routine chronic illness care. Patient understands plan of care. Patient has provided input and agrees with goals.

## 2020-03-20 NOTE — PROGRESS NOTES
1. Have you been to the ER, urgent care clinic since your last visit? Hospitalized since your last visit? No    2. Have you seen or consulted any other health care providers outside of the [address] [place] [address] since your last visit? Include any pap smears or colon screening.  No

## 2020-03-20 NOTE — PATIENT INSTRUCTIONS
Stopping Smoking: Care Instructions Your Care Instructions Cigarette smokers crave the nicotine in cigarettes. Giving it up is much harder than simply changing a habit. Your body has to stop craving the nicotine. It is hard to quit, but you can do it. There are many tools that people use to quit smoking. You may find that combining tools works best for you. There are several steps to quitting. First you get ready to quit. Then you get support to help you. After that, you learn new skills and behaviors to become a nonsmoker. For many people, a necessary step is getting and using medicine. Your doctor will help you set up the plan that best meets your needs. You may want to attend a smoking cessation program to help you quit smoking. When you choose a program, look for one that has proven success. Ask your doctor for ideas. You will greatly increase your chances of success if you take medicine as well as get counseling or join a cessation program. 
Some of the changes you feel when you first quit tobacco are uncomfortable. Your body will miss the nicotine at first, and you may feel short-tempered and grumpy. You may have trouble sleeping or concentrating. Medicine can help you deal with these symptoms. You may struggle with changing your smoking habits and rituals. The last step is the tricky one: Be prepared for the smoking urge to continue for a time. This is a lot to deal with, but keep at it. You will feel better. Follow-up care is a key part of your treatment and safety. Be sure to make and go to all appointments, and call your doctor if you are having problems. It's also a good idea to know your test results and keep a list of the medicines you take. How can you care for yourself at home? · Ask your family, friends, and coworkers for support. You have a better chance of quitting if you have help and support.  
· Join a support group, such as Nicotine Anonymous, for people who are trying to quit smoking. · Consider signing up for a smoking cessation program, such as the American Lung Association's Freedom from Smoking program. 
· Get text messaging support. Go to the website at www.smokefree. gov to sign up for the [facility] program. 
· Set a quit date. Pick your date carefully so that it is not right in the middle of a big deadline or stressful time. Once you quit, do not even take a puff. Get rid of all ashtrays and lighters after your last cigarette. Clean your house and your clothes so that they do not smell of smoke. · Learn how to be a nonsmoker. Think about ways you can avoid those things that make you reach for a cigarette. ? Avoid situations that put you at greatest risk for smoking. For some people, it is hard to have a drink with friends without smoking. For others, they might skip a coffee break with coworkers who smoke. ? Change your daily routine. Take a different route to work or eat a meal in a different place. · Cut down on stress. Calm yourself or release tension by doing an activity you enjoy, such as reading a book, taking a hot bath, or gardening. · Talk to your doctor or pharmacist about nicotine replacement therapy, which replaces the nicotine in your body. You still get nicotine but you do not use tobacco. Nicotine replacement products help you slowly reduce the amount of nicotine you need. These products come in several forms, many of them available over-the-counter: ? Nicotine patches ? Nicotine gum and lozenges ? Nicotine inhaler · Ask your doctor about bupropion (Wellbutrin) or varenicline (Chantix), which are prescription medicines. They do not contain nicotine. They help you by reducing withdrawal symptoms, such as stress and anxiety. · Some people find hypnosis, acupuncture, and massage helpful for ending the smoking habit. · Eat a healthy diet and get regular exercise. Having healthy habits will help your body move past its craving for nicotine. · Be prepared to keep trying. Most people are not successful the first few times they try to quit. Do not get mad at yourself if you smoke again. Make a list of things you learned and think about when you want to try again, such as next week, next month, or next year. Where can you learn more? Go to http://vangie-sanjay.info/ Enter L334 in the search box to learn more about \"Stopping Smoking: Care Instructions. \" Current as of: July 4, 2019Content Version: 12.4 © 9965-4681 [employer]. Care instructions adapted under license by [employer] (which disclaims liability or warranty for this information). If you have questions about a medical condition or this instruction, always ask your healthcare professional. [address] any warranty or liability for your use of this information. Medicare Wellness Visit, Male The best way to live healthy is to have a lifestyle where you eat a well-balanced diet, exercise regularly, limit alcohol use, and quit all forms of tobacco/nicotine, if applicable. Regular preventive services are another way to keep healthy. Preventive services (vaccines, screening tests, monitoring & exams) can help personalize your care plan, which helps you manage your own care. Screening tests can find health problems at the earliest stages, when they are easiest to treat. [facility] follows the current, evidence-based guidelines published by the [place] Nehemiah Markel (USPSTF) when recommending preventive services for our patients. Because we follow these guidelines, sometimes recommendations change over time as research supports it. (For example, a prostate screening blood test is no longer routinely recommended for men with no symptoms).   Of course, you and your doctor may decide to screen more often for some diseases, based on your risk and co-morbidities (chronic disease you are already diagnosed with). Preventive services for you include: - Medicare offers their members a free annual wellness visit, which is time for you and your primary care provider to discuss and plan for your preventive service needs. Take advantage of this benefit every year! 
-All adults over age 72 should receive the recommended pneumonia vaccines. Current USPSTF guidelines recommend a series of two vaccines for the best pneumonia protection.  
-All adults should have a flu vaccine yearly and tetanus vaccine every 10 years. 
-All adults age 48 and older should receive the shingles vaccines (series of two vaccines). -All adults age 38-68 who are overweight should have a diabetes screening test once every three years.  
-Other screening tests & preventive services for persons with diabetes include: an eye exam to screen for diabetic retinopathy, a kidney function test, a foot exam, and stricter control over your cholesterol.  
-Cardiovascular screening for adults with routine risk involves an electrocardiogram (ECG) at intervals determined by the provider.  
-Colorectal cancer screening should be done for adults age 54-65 with no increased risk factors for colorectal cancer. There are a number of acceptable methods of screening for this type of cancer. Each test has its own benefits and drawbacks. Discuss with your provider what is most appropriate for you during your annual wellness visit. The different tests include: colonoscopy (considered the best screening method), a fecal occult blood test, a fecal DNA test, and sigmoidoscopy. 
-All adults born between [facility] should be screened once for Hepatitis C. 
-An Abdominal Aortic Aneurysm (AAA) Screening is recommended for men age 73-68 who has ever smoked in their lifetime.   
 
Here is a list of your current Health Maintenance items (your personalized list of preventive services) with a due date: 
Health Maintenance Due Topic Date Due  
 Hepatitis C Test  1955  Pneumococcal Vaccine (1 of 3 - PCV13) 06/19/1961  
 DTaP/Tdap/Td  (1 - Tdap) 06/19/1976  Shingles Vaccine (1 of 2) 06/19/2005  Colon Cancer Stool Test  06/19/2005 [address] [place] [address] Annual Well Visit  02/28/2020

## 2020-03-20 NOTE — ACP (ADVANCE CARE PLANNING)
Advance Care Planning (ACP) Provider Note - Comprehensive     Date of ACP Conversation: 03/20/20  Persons included in Conversation:  patient  Length of ACP Conversation in minutes:  16 minutes    Authorized Decision Maker (if patient is incapable of making informed decisions):    This person is:  has yet to delegate, thinking daughter Marrion Grise for ALL Patients with Decision Making Capacity:   Importance of advance care planning, including choosing a healthcare agent to communicate patient's healthcare decisions if patient lost the ability to make decisions, such as after a sudden illness or accident  Understanding of the healthcare agent role was assessed and information provided  Exploration of values, goals, and preferences if recovery is not expected, even with continued medical treatment in the event of: Imminent death  Severe, permanent brain injury  Still considering, leaning towards comfort care      For Serious or Chronic Illness:  Understanding of medical condition      Interventions Provided:  Recommended completion of Advance Directive form after review of ACP materials and conversation with prospective healthcare agent   Recommended communicating the plan and making copies for the healthcare agent, personal physician, and others as appropriate (e.g., health system)  Recommended review of completed ACP document annually or upon change in health status

## 2020-06-08 NOTE — TELEPHONE ENCOUNTER
Pt states that his cardiologist put him on Eloquist for about the past 2 weeks and every now and then he has some heart palpitations and he was told to call his PCP. Please advise.

## 2020-06-10 NOTE — TELEPHONE ENCOUNTER
Patient identified with 2 identifiers (name and ). Patient states Dr. Cinhtya Garcia started him on eliquis about 2 weeks a go due to blood clot in leg. Patient is asking if he could cut the Eliquis in half due to he feels its causing him to get SOB. Advised patient that he should get in touch with Dr. Cinthya Garcia office due to her prescribing the Eliquis. I reviewed ER precautions with patient. Patient verbalizes understanding.

## 2020-07-13 NOTE — PROGRESS NOTES
Heydi Vinson, 72 y.o.,  male    SUBJECTIVE  Requesting 2nd opinion oncologist    Pt with known history of prostate cancer, recent imaging showing dramatic progression of metastatic disease, now widespread. Reviewed care everywhere note Dr. Fouzia Al, CT 7/1/2020 showing new extensive bilateral pulmonary nodules, liver, retroperitoneal/medistainal LN, adrenal gland. He was told of poor prognosis \"only weeks to live\" and transferred care to home hospice care. He is requesting 2nd opinion. He is home with daughter, assisting with care. He is on opioids for analgesia. ROS:  See HPI, all others negative        Patient Active Problem List   Diagnosis Code    Chronic obstructive pulmonary disease ([facility] J44.9    Malignant neoplasm of prostate ([facility] C61    Primary osteoarthritis involving multiple joints M89.49    Lumbar radiculopathy M54.16    Tobacco use Z72.0       Current Outpatient Medications   Medication Sig Dispense Refill    dexAMETHasone (DECADRON) 4 mg tablet 1 TABLET ORALLY DAILY IN THE MORNING      HYDROmorphone (DILAUDID) 4 mg tablet       morphine (ROXANOL) 100 mg/5 mL (20 mg/mL) concentrated solution       cyclobenzaprine (FLEXERIL) 5 mg tablet Take 5 mg by mouth.  oxyCODONE-acetaminophen (PERCOCET) 5-325 mg per tablet Take  by mouth every four (4) hours as needed for Pain.  morphine CR (MS CONTIN) 30 mg CR tablet TAKE 1 TAB BY MOUTH EVERY 12 HOURS.  albuterol (PROVENTIL HFA, VENTOLIN HFA, PROAIR HFA) 90 mcg/actuation inhaler INHALE 2 PUFFS BY MOUTH EVERY 4 HOURS AS NEEDED FOR WHEEZING 6.7 Inhaler 2    enzalutamide (XTANDI) 40 mg capsule Take 160 mg by mouth daily.  diclofenac (VOLTAREN) 1 % gel Apply  to affected area four (4) times daily.  predniSONE (DELTASONE) 5 mg tablet Take  by mouth.  diazePAM (VALIUM) 5 mg tablet Take 5 mg by mouth every six (6) hours as needed for Anxiety.  abiraterone (ZYTIGA) 250 mg tab Take  by mouth.          Allergies   Allergen Reactions    Naproxen Hives       Past Medical History:   Diagnosis Date    COPD (chronic obstructive pulmonary disease) (HCC)     Prostate cancer (HCC)     Prostate cancer metastatic to multiple sites [facility])        Social History     Socioeconomic History    Marital status: SINGLE     Spouse name: Not on file    Number of children: Not on file    Years of education: Not on file    Highest education level: Not on file   Occupational History    Not on file   Social Needs    Financial resource strain: Not on file    Food insecurity     Worry: Not on file     Inability: Not on file    Transportation needs     Medical: Not on file     Non-medical: Not on file   Tobacco Use    Smoking status: Current Every Day Smoker    Smokeless tobacco: Never Used   Substance and Sexual Activity    Alcohol use: Not Currently    Drug use: Not on file    Sexual activity: Not on file   Lifestyle    Physical activity     Days per week: Not on file     Minutes per session: Not on file    Stress: Not on file   Relationships    Social connections     Talks on phone: Not on file     Gets together: Not on file     Attends [employer] service: Not on file     Active member of club or organization: Not on file     Attends meetings of clubs or organizations: Not on file     Relationship status: Not on file    Intimate partner violence     Fear of current or ex partner: Not on file     Emotionally abused: Not on file     Physically abused: Not on file     Forced sexual activity: Not on file   Other Topics Concern    Not on file   Social History Narrative    Not on file       Family History   Problem Relation Age of Onset    Cancer Mother     Cancer Father          OBJECTIVE    Physical Exam:     Visit Vitals  /58 (BP 1 Location: Left arm, BP Patient Position: Sitting)   Pulse (!) 55   Temp 98.1 °F (36.7 °C) (Oral)   Resp 16   Ht 5' 6\" (1.676 m)   Wt 125 lb (56.7 kg)   SpO2 99%   BMI 20.18 kg/m²       General: alert, ill-appearing, cachectic in no apparent distress or pain  Head: atraumatic. Non-tender maxillary and frontal sinuses  CVS: normal rate, regular rhythm, distinct S1 and S2  Lungs:clear to ausculation bilaterally, no crackles, wheezing or rhonchi noted  Abdomen: normoactive bowel sounds, soft, non-tender  Extremities: no edema, no cyanosis, MSK grossly normal  Skin: warm, no lesions, rashes noted  Psych:  mood and affect normal        ASSESSMENT/PLAN  Diagnoses and all orders for this visit:    1. Malignant neoplasm of prostate [facility])  With new finding of widespread metastasis  Transitioned to hospice care  He is requesting 2nd opinion  urgent-     REFERRAL TO ONCOLOGY              Patient understands plan of care. Patient has provided input and agrees with goals.

## 2020-07-13 NOTE — PROGRESS NOTES
1. Have you been to the ER, urgent care clinic since your last visit? Hospitalized since your last visit? No    2. Have you seen or consulted any other health care providers outside of the [address] [place] [address] since your last visit? Include any pap smears or colon screening.  No     Hospice coming in now

## 2020-07-13 NOTE — PATIENT INSTRUCTIONS
Living With Cancer: Care Instructions Your Care Instructions People with cancer are living longer and better than ever. Everyone deals with cancer differently. What is important to you may change based on your experiences. You may appreciate your life, family, and friends more. But you also may need to adapt to the changes that cancer causes. Some of the changes, such as hair loss, are temporary. Others, like the loss of a breast or other organ, are permanent. Getting back to normal can be a challenge. Allow yourself time to adjust. A positive attitude and a strong, fighting spirit can help you cope. You may come back stronger than ever. But you may not be able to do everything you did before cancer. If you notice changes in your ability to function, talk to your doctor about them. You may be afraid that your cancer will return. But a healthy diet, regular exercise, and an end to unhealthy habits like smoking can improve your health. Follow-up care is a key part of your treatment and safety. Be sure to make and go to all appointments, and call your doctor if you are having problems. It's also a good idea to know your test results and keep a list of the medicines you take. How can you care for yourself at home? · Focus on activities that help you cope with cancer treatment. ? Your daily routine may change during treatment. You may not have the energy you normally do. Rest when you need to. 
? Your roles and family duties may change. This can cause stress and anger in your family. Talk openly with your family about ways to work together. ? Talking about your cancer may help. Some relationships may be strained. But some may get stronger. Try to keep in contact with loved ones, and tell them how they can help or understand you. 
? You may lose interest in sex or be too tired for it. Some cancers may make sex painful. Talk to your doctor about ways to improve sex if it is important to you. ? Accept that you may need help with shopping, yard work, or other tasks. Asking for help does not mean you are weak or helpless. Hire someone to help if you can afford it. ? You may find it hard to deal with medical bills and insurance. Ask a family member or friend to help. A  or other social service expert may be able to help with your finances. Some organizations, such as the [employer], may also be able to help. You can contact the [employer] at their website at www. [employer]. org, or call 9-323.998.5847. 
? The [employer] also has many helpful resources for cancer survivors. You can contact them at 3-609.798.2815 or visit their website at [address] [facility] 
· Be aware that cancer treatment can change the way you look. You may have scars, hair loss, and weight changes. ? You may be angry, frustrated, or disappointed after cancer surgery or during treatment for cancer. These can be common reactions. Talk to a counselor if you need help coping with your feelings. ? You may have wounds or skin problems from the surgery. You may have scars from a tumor or side effects from radiation. Ask your doctor what to expect from treatment. This can help you prepare for any side effects. Your doctor can also help you find treatments to help you heal. 
? Surgery can remove scars or rebuild a part of the body you had removed. Insurance may cover some of this. ? Wearing a wig, scarf, or hat can help you cope with hair loss. ? Staying active can help you feel good about the way you look. Try to keep up your appearance as much as possible. It will help your morale. Pamper yourself. Get a manicure or pedicure, or take a long, relaxing bath. · Do not smoke or allow others to smoke around you. This can improve the way you feel. If you need help quitting, talk to your doctor about stop-smoking programs and medicines.  These can increase your chances of quitting for good. When should you call for help? Watch closely for changes in your health, and be sure to contact your doctor if you have any problems. Where can you learn more? Go to http://www.gray.com/ Enter B189 in the search box to learn more about \"Living With Cancer: Care Instructions. \" Current as of: August 22, 2019               Content Version: 12.5 © 3277-1033 [employer]. Care instructions adapted under license by [employer] (which disclaims liability or warranty for this information). If you have questions about a medical condition or this instruction, always ask your healthcare professional. [address] any warranty or liability for your use of this information.

## 2020-07-15 NOTE — LETTER
7/20/20 Patient: Donna Andrews YOB: 1955 Date of Visit: 7/15/2020 Pablo Caceres, [address] [id number] [place] [address] VIA In Basket Dear Pablo Caceres MD, Thank you for referring Mr. Donna Andrews to [facility] for evaluation. My notes for this consultation are attached. If you have questions, please do not hesitate to call me. I look forward to following your patient along with you. Sincerely, Darleen Bradley MD

## 2020-07-15 NOTE — PROGRESS NOTES
Hematology/Oncology Consultation Note      Date: 7/15/2020    Name: Brad Salgado  : 1955        Little Carver MD         Subjective:     Chief complaint:  second opinion of metastatic prostate cancer. History of Present Illness:   Mr. Joshua Weaver is a most pleasant 72y.o. year old male who was seen for second opinion of metastatic prostate cancer. The patient has a past medical history significant of metastatic prostate cancer status post multiple lines of therapy. He has follow-up with Dr. Jesse Matthew at [facility] for his cancer treatment. He state his PSA was significant increasing about 1769 on 2020. His recent CT on 2020 reported new extensive bilateral pulmonary nodules, liver, retroperitoneal/medistainal LN, adrenal gland. And therefore he was transferred care to home hospice care. Today the patient reports chronic fatigue with generalized weakness. He has chronic pain, on narcotics provided by his hospice care. He said he try to minimize using narcotics at this time and will try to keep his body active. He has multiple questions about disease status and dexamethasone using. The patient understood his metastatic prostate cancer condition and still want to receive hospice care. The patient otherwise has no other complaints. Denied fever, chills, night sweat, acute bleeding or bruising issues. Denied headache, acute vision change, chest pain, worsen shortness of breath, palpitation, productive cough, nausea, vomiting, worsening abdominal pain.     Past Medical History, Family History, and Social History:    Past Medical History:   Diagnosis Date    COPD [facility]chronic obstructive [facility] disease) ([facility] [address]     Prostate cancer ([facility] [address]     Prostate cancer metastatic to multiple sites [facility])      Past Surgical History:   Procedure Laterality Date    HX HERNIA REPAIR      HX UROLOGICAL      Prostate Biopsy     Social History     Socioeconomic History    Marital status: SINGLE     Spouse name: Not on file    Number of children: Not on file    Years of education: Not on file    Highest education level: Not on file   Occupational History    Not on file   Social Needs    Financial resource strain: Not on file    Food insecurity     Worry: Not on file     Inability: Not on file    Transportation needs     Medical: Not on file     Non-medical: Not on file   Tobacco Use    Smoking status: Current Every Day Smoker    Smokeless tobacco: Never Used   Substance and Sexual Activity    Alcohol use: Not Currently    Drug use: Not on file    Sexual activity: Not on file   Lifestyle    Physical activity     Days per week: Not on file     Minutes per session: Not on file    Stress: Not on file   Relationships    Social connections     Talks on phone: Not on file     Gets together: Not on file     Attends Jain service: Not on file     Active member of club or organization: Not on file     Attends meetings of clubs or organizations: Not on file     Relationship status: Not on file    Intimate partner violence     Fear of current or ex partner: Not on file     Emotionally abused: Not on file     Physically abused: Not on file     Forced sexual activity: Not on file   Other Topics Concern    Not on file   Social History Narrative    Not on file     Family History   Problem Relation Age of Onset    Cancer Mother     Cancer Father      Current Outpatient Medications   Medication Sig Dispense Refill    dexAMETHasone (DECADRON) 4 mg tablet 1 TABLET ORALLY DAILY IN THE MORNING      HYDROmorphone (DILAUDID) 4 mg tablet       morphine CR (MS CONTIN) 30 mg CR tablet TAKE 1 TAB BY MOUTH EVERY 12 HOURS.  morphine (ROXANOL) 100 mg/5 mL (20 mg/mL) concentrated solution       albuterol (PROVENTIL HFA, VENTOLIN HFA, PROAIR HFA) 90 mcg/actuation inhaler INHALE 2 PUFFS BY MOUTH EVERY 4 HOURS AS NEEDED FOR WHEEZING 6.7 Inhaler 2    enzalutamide (XTANDI) 40 mg capsule Take 160 mg by mouth daily.       cyclobenzaprine (FLEXERIL) 5 mg tablet Take 5 mg by mouth.  diclofenac (VOLTAREN) 1 % gel Apply  to affected area four (4) times daily.  oxyCODONE-acetaminophen (PERCOCET) 5-325 mg per tablet Take  by mouth every four (4) hours as needed for Pain.  predniSONE (DELTASONE) 5 mg tablet Take  by mouth.  diazePAM (VALIUM) 5 mg tablet Take 5 mg by mouth every six (6) hours as needed for Anxiety.  abiraterone (ZYTIGA) 250 mg tab Take  by mouth. Review of Systems   Constitutional: Positive for malaise/fatigue and weight loss. Negative for chills, diaphoresis and fever. Respiratory: Negative for cough, hemoptysis, shortness of breath and wheezing. Cardiovascular: Negative for chest pain, palpitations and leg swelling. Gastrointestinal: Negative for abdominal pain, diarrhea, heartburn, nausea and vomiting. Genitourinary: Negative for dysuria, frequency, hematuria and urgency. Musculoskeletal: Positive for back pain. Negative for joint pain and myalgias. Skin: Negative for itching and rash. Neurological: Negative for dizziness, seizures, weakness and headaches. Psychiatric/Behavioral: Negative for depression. The patient does not have insomnia. Objective:     Visit Vitals  BP (!) 86/56 (BP Patient Position: Sitting)   Pulse 80   Temp 98.8 °F (37.1 °C) (Oral)   Resp 16   Ht 5' 10\" (1.778 m)   Wt 56.2 kg (123 lb 12.8 oz)   SpO2 98%   BMI 17.76 kg/m²       ECOG Performance Status (grade): 2  0 - able to carry on all pre-disease activity w/out restriction  1 - restricted but able to carry out light work  2 - ambulatory and can self- care but unable to carry out work  3 - bed or chair >50% of waking hours  4 - completely disable, total care, confined to bed or chair    Physical Exam  Constitutional:       General: He is not in acute distress. Appearance: He is ill-appearing. HENT:      Head: Normocephalic and atraumatic.    Eyes:      Pupils: Pupils are equal, round, and reactive to light. Neck:      Musculoskeletal: Neck supple. No neck rigidity. Cardiovascular:      Pulses: Normal pulses. Heart sounds: Normal heart sounds. No murmur. Pulmonary:      Effort: Pulmonary effort is normal. No respiratory distress. Breath sounds: Normal breath sounds. Abdominal:      General: Bowel sounds are normal. There is no distension. Palpations: Abdomen is soft. There is no mass. Tenderness: There is no abdominal tenderness. There is no guarding. Musculoskeletal:         General: No swelling or tenderness. Lymphadenopathy:      Cervical: No cervical adenopathy. Skin:     General: Skin is warm. Findings: No rash. Neurological:      Mental Status: He is alert and oriented to person, place, and time. Mental status is at baseline. Cranial Nerves: No cranial nerve deficit. Psychiatric:         Mood and Affect: Mood normal.          Diagnostics:      No results found for this or any previous visit (from the past 96 hour(s)). Imaging:  No results found for this or any previous visit. Results for orders placed during the hospital encounter of 20   XR CHEST PA LAT    Narrative PA And Lateral Chest    CPT CODE: 25361    COMPARISON: None. CLINICAL INFORMATION: Cough and congestion. FINDINGS:    Heart size and mediastinal contours within normal limits. No pulmonary vascular  congestion, effusion, or pneumothorax. No focal consolidation. Mild degenerative  change in the spine. .      Impression IMPRESSION:    1. No radiographic evidence for an acute cardiopulmonary abnormality.          Results for orders placed in visit on 19   CT ABD [address] [facility] Scan  [address]  [phone]      Imaging Result       Name:    Jolly Bridges ([id number])  Sex: Male :  1955 Ordering Provider: Schuyler NATION Provider:   Diagnosis:  Bone metastasis ([facility] [address] [C79.51 (ICD-10-CM)]  Prostate cancer ([facility] [address] Lj Mobile (ICD-10-CM)]  None Specified  Procedures Performed:  CT ABD/PELVIS-IV AND ORAL  [id number] Exam Date/Time:  09/13/2017 12:05 PM              EXAM: CT of the abdomen and pelvis     INDICATION: Prostate carcinoma with bone metastasis and low back/left hip pain  COMPARISON: Outside CT scan dated 02/20/17     TECHNIQUE: Axial CT imaging of the abdomen and pelvis was performed after intravenous and oral contrast. Multiplanar reformats were generated. One or more dose reduction techniques were used on this CT: automated exposure control, adjustment of the mAs and/or kVp according to patient size, and iterative reconstruction techniques. The specific techniques used on this CT exam have been documented in the patient's electronic medical record.  _______________     FINDINGS:     LOWER CHEST: Unremarkable.     LIVER, BILIARY: Numerous low-density lesions throughout the liver have the appearance of cysts although some are too small for adequate CT densitometry. They appear stable compared to the prior outside study. No significant biliary dilatation is identified. Gallbladder is unremarkable.     PANCREAS: Normal.     SPLEEN: Normal.     ADRENALS: Normal.     KIDNEYS/URETERS/BLADDER: No evidence of hydronephrosis or mass. Multiple low density lesions likely represent cysts or other benign foci similar to the prior study. The bladder appears within normal limits.     LYMPH NODES: No enlarged lymph nodes.     GASTROINTESTINAL TRACT: Extensive colonic diverticula are seen worst in the descending and sigmoid colon without evidence of acute diverticulitis or obstruction.  The appendix appears normal.     PELVIC ORGANS: Prostate unremarkable.     VASCULATURE: Prominent atherosclerotic calcifications are seen without aneurysm.     BONES: Extensive sclerotic changes are seen throughout the visualized bony structures including the spine and the pelvis (worse on the right) mildly worsening compared to the prior outside study. Degenerative changes also present in the spine.     OTHER: None.  _______________  IMPRESSION  IMPRESSION:     Extensive metastatic disease throughout the visualized bones (worst involving the right pelvis) mildly worsening compared to the prior outside study of 02/20/17. Extensive colonic diverticulosis without diverticulitis or obstruction. Multiple low-density lesions throughout the liver and kidneys likely cysts although some are too small for adequate CT densitometry but appear stable since the prior study.        Dictated by: Bret Winter on Wed Sep 13, 2017  4:12:24 PM EDT  Signed By:Kar Mckeon MD   9/13/2017  4:25 PM        [facility] [[id number]]      ~~This report was copied and pasted from the servicing EHR system. ~~         CT Chest/A/P 7/2/2020  IMPRESSION  1. Dramatic progression of metastatic disease, now widespread. --New extensive bilateral pulmonary nodules. --New extensive liver metastatic disease. --New retroperitoneal lymphadenopathy. --New mediastinal/hilar lymphadenopathy. --Progressed blastic osseous metastatic disease. --New masslike expansion of the left adrenal gland. 2. Ovoid masslike tissue posterior to the rectum is unchanged since 2017. Assessment:                                        1. Malignant neoplasm of prostate ([facility] [address]    2. Metastasis from malignant neoplasm of prostate [facility])        Plan:                                        # Metastatic prostate cancer  -- Mr. Staci Agee is a most pleasant 72y.o. year old male who was seen for second opinion of metastatic prostate cancer. -- He has follow-up with Dr. Camryn White at [facility] for his cancer treatment. He has received multiple lines of therapy in the past, include Taxotere, Abiraterone+ prednisone, off-label Pembrolizumab; palliative XRT.  His most recent Pembrolizumab was given on 6/25.  -- His PSA was significant increasing about 1769 on June 2020. Subsequently CT on 7/1/2020 reported extensive bilateral pulmonary nodules, liver, retroperitoneal/medistainal LN, adrenal gland. He was then transferred care to home hospice care. --Today the patient has multiple questions about his disease status and dexamethasone using. He asked if those new nodules in his liver on recent CT could be just an immunotherapy related \"tumor flares\". I have discussed with him that given his increasing PSA and widespread lesions in bilateral lung, liver, lymph nodes, and adrenal gland, I am very concerned that those widespread lesions are from metastatic disease progression. He also asked about the role of dexamethasone. I have explained to him I don't have a full record from his primary Oncologist for review in detail. However, steroids can be used in many cancer patients to treat their nausea and vomiting, to stimulate appetite, or pain control, as well as other supportive measures. -- The patient expressed the understanding of his widespread metastatic disease after multiple treatment therapy. He would like to continue hospice care and will contact his primary oncologist when needed. We have advised the patient to contact our office if any additional support is needed. No orders of the defined types were placed in this encounter. All of patient's questions answered to their apparent satisfaction. They verbally show understanding and agreement with aforementioned plan. I would like to thank Dr Zoraida Garces MD  for allowing me to participate in the care of this very pleasant patient. If I can be of further assistance please do not hesitate to call. Amish Pretty MD  7/15/2020          About 45 minutes were spent for this encounter with more than 50% of the time spent in face-to-face counseling, discussing on diagnosis and management plan going forward, and co-ordination of care.   Parts of this document has been produced using Dragon dictation system. Unrecognized errors in transcription may be present. Please do not hesitate to reach out for any questions or clarifications.         CC: Anna Uribe MD